# Patient Record
Sex: MALE | Race: WHITE | Employment: FULL TIME | URBAN - METROPOLITAN AREA
[De-identification: names, ages, dates, MRNs, and addresses within clinical notes are randomized per-mention and may not be internally consistent; named-entity substitution may affect disease eponyms.]

---

## 2018-05-20 ENCOUNTER — HOSPITAL ENCOUNTER (EMERGENCY)
Age: 28
Discharge: HOME | End: 2018-05-20
Attending: EMERGENCY MEDICINE
Payer: COMMERCIAL

## 2018-05-20 VITALS
SYSTOLIC BLOOD PRESSURE: 141 MMHG | DIASTOLIC BLOOD PRESSURE: 83 MMHG | OXYGEN SATURATION: 99 % | HEART RATE: 74 BPM | BODY MASS INDEX: 28.15 KG/M2 | TEMPERATURE: 98.2 F | RESPIRATION RATE: 16 BRPM | HEIGHT: 71 IN | WEIGHT: 201.06 LBS

## 2018-05-20 DIAGNOSIS — T14.8XXA ABRASION: Primary | ICD-10-CM

## 2018-05-20 DIAGNOSIS — R19.8 UMBILICUS DISCHARGE: ICD-10-CM

## 2018-05-20 PROCEDURE — 99283 EMERGENCY DEPT VISIT LOW MDM: CPT

## 2018-05-20 PROCEDURE — 74011000250 HC RX REV CODE- 250: Performed by: PHYSICIAN ASSISTANT

## 2018-05-20 PROCEDURE — 74011250637 HC RX REV CODE- 250/637: Performed by: PHYSICIAN ASSISTANT

## 2018-05-20 RX ORDER — MUPIROCIN 20 MG/G
OINTMENT TOPICAL EVERY 12 HOURS
Status: DISCONTINUED | OUTPATIENT
Start: 2018-05-20 | End: 2018-05-20 | Stop reason: HOSPADM

## 2018-05-20 RX ORDER — MUPIROCIN 20 MG/G
OINTMENT TOPICAL 3 TIMES DAILY
Qty: 22 G | Refills: 0 | Status: SHIPPED | OUTPATIENT
Start: 2018-05-20

## 2018-05-20 RX ADMIN — MUPIROCIN: 20 OINTMENT TOPICAL at 18:19

## 2018-05-20 RX ADMIN — Medication 2 ML: at 17:30

## 2018-05-20 RX ADMIN — BACITRACIN ZINC, NEOMYCIN SULFATE, POLYMYXIN B SULFATE 1 PACKET: 3.5; 5000; 4 OINTMENT TOPICAL at 17:30

## 2018-05-20 NOTE — DISCHARGE INSTRUCTIONS
[facility] SYSTEMS Departments     For adult and child immunizations, family planning, TB screening, STD testing and women's health services. [place]: [place] [phone]      [employer] Maria Alejandra [address] 25   657 [facility]   [address]   170 [facility] [address]  [phone]      [address]          [address] [place] [address]     For primary care services, woman and child wellness, and some clinics providing specialty care. [facility] [address] [facility] [address] [place] [address] [phone]   [facility] 200 [facility] [phone]   339 [facility] [address]  [phone] 11878 [facility] [address] [place] [address] [address] [place] [address]  [phone]   7701 [facility] [phone]   [facility] 81 [facility] [phone]   [facility] [address] [place], [place] [phone]   [facility]: [facility] [address], ext [facility] 79 [facility] [address] [phone]     Afshin [address] [facility] [address] [phone]   [facility] 5850 [facility]  [phone]   [employer]  [address], [address] ([email or web address]) [phone]         Sexual Health/Woman Wellness Clinics    For STD/HIV testing and treatment, pregnancy testing and services, men's health, birth control services, LGBT services, and hepatitis/HPV vaccine services. [employer] for [place] [employer] [address] 75 [facility] 1579 [address] [phone]   [facility] [address], 5th floor [phone]   Pregnancy [address] Women [address]  Vivienne Jha [phone]         Specialty Service 9290 [facility]   [phone]   Josephine   [phone]   Women, Infant and Children's Services: [address] [phone] 600 Critical access hospital   [phone]   [address]   [facility]     [phone]   [address] Crisis   [address] [phone]     Local Primary Care Physicians  [facility] [phone]  MD Clarisa Higgins MD Lonie Landsberg, MD [facility] Community Doctors [phone]  Debi Collins, [facility]  MD Mechelle Parker MD Darcey Hahn, MD [address] [place] [address] [phone]  MD Flaquito Shaffer MD [address] [facility] [phone]  MD Lindsey Disla, MD Madina Rodriguez MD   [facility] [phone]  [facility] INDERJIT , MD Alo Negrete, NP [address] [phone]  Afshin Magana, MD Yousuf Schmitt MD Patrisha Ganong, MD Felecia Amass, MD Molly Crank, MD Odella Ormond, MD   33 57 [facility]  Anselmo Phillips MD [address] [phone]  MD Luzmaria Delgado, NP  MD Keisha Jacobo MD Agustin Kaiser, MD Lyndal Cromer, MD David Gant, MD   8262 [facility] [phone]  MD Agnieszka Valencia, P  Vineet Romero, MD Max Paz MD Neal Coulter, MD Freda Mcmurray, MD [facility] [phone]  MD Shelley Aparicio MD Marcellina Hopper, MD Valentin Corrigan, MD Blase Cree, MD   [address] [phone]  MD Adrianne Duarte MD [place] [phone]  Lesta Reeks, MD Roxana Rubinstein, MD Armon Brenner [place], [address] [facility] [phone]  Eugenia Hall, MD Marie Gómez, MD Rosa Casas, MD Suhas Brunner, MD Mar Washington, MD Jessica Eller, NP  Gopal Vela MD [address]  66   [phone]  Juliana Avers, MD Jacqualine Schaumann, MD Ty Maurice MD   2102 [facility] [phone]  Ronal Najera, MD Vanessa Antunez, P  Wei Alanis, PA-C  Raghu Delgadillo, FNP  Tavo Cancino, PA-C  MD Yady Putnam, NP   Tony Gilford, DO Miscellaneous:  Vikas Bourgeois [phone]

## 2018-05-20 NOTE — ED PROVIDER NOTES
EMERGENCY DEPARTMENT HISTORY AND PHYSICAL EXAM      Date: 5/20/2018  Patient Name: Kwabena Linn    History of Presenting Illness     Chief Complaint   Patient presents with    Laceration     Patient reports feeling a \"tear\" sensation to his umbilical area while lifting a keg at work on Friday. Pt reports area began bleeding today and was informed by [facility] that there was a laceration inside his belly button. History Provided By: Patient    HPI: Kwabena Linn, 33 y.o. male with no significant PMHx, presents ambulatory to the ED with cc of bleeding from his umbilicus which started today. Pt states he was seen at [facility] where he was diagnosed with a laceration without foreign body of the abdominal wall without penetration into the abdominal cavity. He was referred to the ED for stitches. Pt states he was pulling a 160 lb keg down a flight of stairs 2 days ago, and he thinks he could have injured his belly button. He reports no direct injury to the area with the event. Pt reports no bleeding after carrying the keg. He states his pain is mild. His pain is worse with touching the affected area. Pt describes that today \"it was like a zit popped and there was a little clear fluid\". He reports no recent fevers. Pt reports no injury today. He is otherwise without complaint. Chief Complaint: bleeding from umbilicus  Duration: 1 day  Timing:  Acute  Location: umbilicus  Quality: Aching  Severity: Mild  Modifying Factors: none  Associated Symptoms: denies any other associated signs or symptoms    There are no other complaints, changes, or physical findings at this time. PCP: None    Past History     Past Medical History:  History reviewed. No pertinent past medical history. Past Surgical History:  History reviewed. No pertinent surgical history. Family History:  History reviewed. No pertinent family history.     Social History:  Social History   Substance Use Topics    Smoking status: Never Smoker    Smokeless tobacco: Never Used    Alcohol use None       Allergies:  No Known Allergies      Review of Systems   Review of Systems   Constitutional: Negative. Negative for fever. HENT: Negative. Eyes: Negative. Respiratory: Negative. Cardiovascular: Negative. Gastrointestinal: Negative. Negative for constipation, diarrhea, nausea and vomiting. Denies liver disease   Endocrine:        Denies thyroid disease   Genitourinary: Negative. Negative for dysuria. Denies kidney disease   Musculoskeletal: Negative. Skin: Positive for wound (bleeding from umbilicus). Neurological: Negative. All other systems reviewed and are negative. Physical Exam   Physical Exam   Constitutional: He is oriented to person, place, and time. He appears well-developed and well-nourished. No distress. HENT:   Head: Normocephalic and atraumatic. Eyes: Conjunctivae and EOM are normal. Pupils are equal, round, and reactive to light. Right eye exhibits no discharge. Left eye exhibits no discharge. No scleral icterus. Neck: Normal range of motion. Neck supple. No tracheal deviation present. Cardiovascular: Normal rate, regular rhythm, normal heart sounds and intact distal pulses. Exam reveals no gallop and no friction rub. No murmur heard. Pulmonary/Chest: Effort normal and breath sounds normal. No respiratory distress. He has no wheezes. He has no rales. He exhibits no tenderness. Abdominal: Soft. Bowel sounds are normal. He exhibits no distension and no mass. There is no tenderness. There is no rebound and no guarding. Pt is thin. Dried blood and tenderness to his umbilicus. No active bleeding. Musculoskeletal: He exhibits no edema or tenderness. Lymphadenopathy:     He has no cervical adenopathy. Neurological: He is alert and oriented to person, place, and time. No cranial nerve deficit. Coordination normal.   Skin: Skin is warm and dry. No rash noted. No erythema. Psychiatric: He has a normal mood and affect. His behavior is normal. Judgment and thought content normal.   Nursing note and vitals reviewed. Medical Decision Making   I am the first provider for this patient. I reviewed the vital signs, available nursing notes, past medical history, past surgical history, family history and social history. Vital Signs-Reviewed the patient's vital signs. Patient Vitals for the past 12 hrs:   Temp Pulse Resp BP SpO2   05/20/18 1459 98.2 °F (36.8 °C) 74 16 141/83 99 %     Records Reviewed: Nursing Notes and Old Medical Records    Provider Notes (Medical Decision Making):   DDx: laceration, abscess     ED Course:   Initial assessment performed. The patients presenting problems have been discussed, and they are in agreement with the care plan formulated and outlined with them. I have encouraged them to ask questions as they arise throughout their visit.    6:01 PM  Area was cleansed with SurClens. Irrigated with saline. LET was applied. The wound is an abrasion/ erosion of the skin on the right side deep in the folds of his umbilicus. There is no laceration in need of repair. No visible abdominal content. No palpable hernia with valsalva. Dr. Anita Curry has seen the area as well and agrees with the care plan. Disposition:  DISCHARGE NOTE  6:03 PM  The patient has been re-evaluated and is ready for discharge. Reviewed available results with patient. Counseled patient on diagnosis and care plan. Patient has expressed understanding, and all questions have been answered. Patient agrees with plan and agrees to follow up as recommended, or return to the ED if their symptoms worsen. Discharge instructions have been provided and explained to the patient, along with reasons to return to the ED. PLAN:  1. Current Discharge Medication List      START taking these medications    Details   mupirocin (BACTROBAN) 2 % ointment Apply  to affected area three (3) times daily.  Apply to area for 10 days  Qty: 22 g, Refills: 0           2. Follow-up Information     Follow up With Details Comments Contact Info    [facility] EMERGENCY DEPT  If symptoms worsen [address] [facility] [id number] [phone]        Return to ED if worse     Diagnosis     Clinical Impression:   1. Abrasion    2. Umbilicus discharge        Attestations:    Attestation Note:  This note is prepared by [facility], acting as Scribe for [address] [place] [address] PAINA: The scribe's documentation has been prepared under my direction and personally reviewed by me in its entirety. I confirm that the note above accurately reflects all work, treatment, procedures, and medical decision making performed by me.

## 2018-05-20 NOTE — ED NOTES
Patient discharged by MIKE Colvin. Patient provided with discharge instructions Rx and instructions on follow up care. Patient out of ED ambulatory accompanied by self.

## 2021-06-14 NOTE — LETTER
[address] 
[facility] EMERGENCY DEPT 
[address] [place] [address] [place] [id number] [phone] Work/School Note Date: 5/20/2018 To Whom It May concern: 
 
Nathalie Ngo was seen and treated today in the emergency room by the following provider(s): 
Attending Provider: Vannessa Joseph MD 
Physician Assistant: MIKE Guerrier. Nathalie Ngo may return to work on 5/23/18 or sooner, if feeling better. He should lift no greater than 15lbs until 5/27/18. Sincerely, Stefano Marcos., PA 
 
 
 
 DIFFICULTY BREATHING/DYSPNEA ON EXERTION/SHORTNESS OF BREATH

## 2023-09-17 SDOH — HEALTH STABILITY: PHYSICAL HEALTH: ON AVERAGE, HOW MANY MINUTES DO YOU ENGAGE IN EXERCISE AT THIS LEVEL?: 40 MIN

## 2023-09-17 SDOH — HEALTH STABILITY: PHYSICAL HEALTH: ON AVERAGE, HOW MANY DAYS PER WEEK DO YOU ENGAGE IN MODERATE TO STRENUOUS EXERCISE (LIKE A BRISK WALK)?: 4 DAYS

## 2023-09-19 ENCOUNTER — OFFICE VISIT (OUTPATIENT)
Facility: CLINIC | Age: 33
End: 2023-09-19

## 2023-09-19 VITALS
DIASTOLIC BLOOD PRESSURE: 65 MMHG | BODY MASS INDEX: 27.02 KG/M2 | RESPIRATION RATE: 16 BRPM | SYSTOLIC BLOOD PRESSURE: 118 MMHG | WEIGHT: 193 LBS | OXYGEN SATURATION: 98 % | TEMPERATURE: 98.6 F | HEIGHT: 71 IN | HEART RATE: 65 BPM

## 2023-09-19 DIAGNOSIS — K21.9 GASTROESOPHAGEAL REFLUX DISEASE, UNSPECIFIED WHETHER ESOPHAGITIS PRESENT: ICD-10-CM

## 2023-09-19 DIAGNOSIS — R10.11 RUQ ABDOMINAL PAIN: Primary | ICD-10-CM

## 2023-09-19 DIAGNOSIS — Z13.6 SCREENING FOR CARDIOVASCULAR CONDITION: ICD-10-CM

## 2023-09-19 RX ORDER — FAMOTIDINE 40 MG/1
40 TABLET, FILM COATED ORAL EVERY EVENING
Qty: 30 TABLET | Refills: 3 | Status: SHIPPED | OUTPATIENT
Start: 2023-09-19

## 2023-09-19 SDOH — ECONOMIC STABILITY: INCOME INSECURITY: HOW HARD IS IT FOR YOU TO PAY FOR THE VERY BASICS LIKE FOOD, HOUSING, MEDICAL CARE, AND HEATING?: NOT HARD AT ALL

## 2023-09-19 SDOH — ECONOMIC STABILITY: FOOD INSECURITY: WITHIN THE PAST 12 MONTHS, YOU WORRIED THAT YOUR FOOD WOULD RUN OUT BEFORE YOU GOT MONEY TO BUY MORE.: NEVER TRUE

## 2023-09-19 SDOH — ECONOMIC STABILITY: HOUSING INSECURITY
IN THE LAST 12 MONTHS, WAS THERE A TIME WHEN YOU DID NOT HAVE A STEADY PLACE TO SLEEP OR SLEPT IN A SHELTER (INCLUDING NOW)?: NO

## 2023-09-19 SDOH — ECONOMIC STABILITY: FOOD INSECURITY: WITHIN THE PAST 12 MONTHS, THE FOOD YOU BOUGHT JUST DIDN'T LAST AND YOU DIDN'T HAVE MONEY TO GET MORE.: NEVER TRUE

## 2023-09-19 ASSESSMENT — ANXIETY QUESTIONNAIRES
2. NOT BEING ABLE TO STOP OR CONTROL WORRYING: 0
IF YOU CHECKED OFF ANY PROBLEMS ON THIS QUESTIONNAIRE, HOW DIFFICULT HAVE THESE PROBLEMS MADE IT FOR YOU TO DO YOUR WORK, TAKE CARE OF THINGS AT HOME, OR GET ALONG WITH OTHER PEOPLE: NOT DIFFICULT AT ALL
1. FEELING NERVOUS, ANXIOUS, OR ON EDGE: 0

## 2023-09-19 ASSESSMENT — PATIENT HEALTH QUESTIONNAIRE - PHQ9
SUM OF ALL RESPONSES TO PHQ QUESTIONS 1-9: 0
1. LITTLE INTEREST OR PLEASURE IN DOING THINGS: 0
2. FEELING DOWN, DEPRESSED OR HOPELESS: 0
SUM OF ALL RESPONSES TO PHQ QUESTIONS 1-9: 0
SUM OF ALL RESPONSES TO PHQ9 QUESTIONS 1 & 2: 0

## 2023-09-19 NOTE — PROGRESS NOTES
Chief Complaint   Patient presents with    Establish Care    Abdominal Pain     Abd pain after eating sometimes upper R/quad pressure  Loose BM x 2 mth,  fever x 1 week on and on prior. Patient has not been out of the country in (14 months), NO diarrhea, NO cough, NO chest conjestion, NO temp. Pt has not been around anyone with these symptoms. Health Maintenance reviewed. I have reviewed the patient's medical history in detail and updated the computerized patient record. no    1. \"Have you been to the ER, urgent care clinic since your last visit? No Hospitalized since your last visit?\"     2. \"Have you seen or consulted any other health care providers outside of the [address] [place] [address] since your last visit? \" no     3. For patients aged 43-73: Has the patient had a colonoscopy / FIT/ Cologuard?  no

## 2023-09-25 ENCOUNTER — TELEPHONE (OUTPATIENT)
Facility: CLINIC | Age: 33
End: 2023-09-25

## 2023-10-16 NOTE — PROGRESS NOTES
Ye Almonte (:  1990) is a 29 y.o. male,Established patient, here for evaluation of the following chief complaint(s):  No chief complaint on file. Abdominal pain       ASSESSMENT/PLAN:     Diagnosis Orders   1. Right upper quadrant abdominal pain            Get US and labs done  F/up after      Subjective   SUBJECTIVE/OBJECTIVE:  HPI  Pain has improved. Started in July. Every other day or so tends to be right sided mid abdo. Lasts maybe half an hr then resolves on its own. Did 'nt do US or labs ordered at the last visit, [place] did not approve. Review of Systems   No constipation, occa loose bowels but no diarrhea. No Fever. No HB No meds    Past Surgical History:   Procedure Laterality Date    WISDOM TOOTH EXTRACTION       Social History     Socioeconomic History    Marital status: Single     Spouse name: Not on file    Number of children: Not on file    Years of education: Not on file    Highest education level: Not on file   Occupational History    Occupation:    Tobacco Use    Smoking status: Some Days     Packs/day: 0.05     Years: 9.00     Additional pack years: 0.00     Total pack years: 0.45     Types: Cigarettes     Passive exposure: Never    Smokeless tobacco: Former   Substance and Sexual Activity    Alcohol use: Not Currently     Comment: occ    Drug use: Never    Sexual activity: Not on file   Other Topics Concern    Not on file   Social History Narrative    Lives alone     Social Determinants of Health     Financial Resource Strain: Low Risk  (2023)    Overall Financial Resource Strain (CARDIA)     Difficulty of Paying Living Expenses: Not hard at all   Food Insecurity: No Food Insecurity (2023)    Hunger Vital Sign     Worried About Running Out of Food in the Last Year: Never true     [address] in the Last Year: Never true   Transportation Needs: Unknown (2023)    PRAPARE - Transportation     Lack of Transportation (Medical):  Not on file     Lack of

## 2023-10-17 ENCOUNTER — OFFICE VISIT (OUTPATIENT)
Facility: CLINIC | Age: 33
End: 2023-10-17
Payer: OTHER GOVERNMENT

## 2023-10-17 VITALS
SYSTOLIC BLOOD PRESSURE: 125 MMHG | OXYGEN SATURATION: 98 % | BODY MASS INDEX: 27.77 KG/M2 | RESPIRATION RATE: 16 BRPM | WEIGHT: 194 LBS | TEMPERATURE: 98.6 F | HEIGHT: 70 IN | DIASTOLIC BLOOD PRESSURE: 81 MMHG | HEART RATE: 72 BPM

## 2023-10-17 DIAGNOSIS — R10.11 RIGHT UPPER QUADRANT ABDOMINAL PAIN: Primary | ICD-10-CM

## 2023-10-17 PROCEDURE — 99213 OFFICE O/P EST LOW 20 MIN: CPT | Performed by: FAMILY MEDICINE

## 2023-10-17 ASSESSMENT — PATIENT HEALTH QUESTIONNAIRE - PHQ9
2. FEELING DOWN, DEPRESSED OR HOPELESS: 1
SUM OF ALL RESPONSES TO PHQ QUESTIONS 1-9: 2
SUM OF ALL RESPONSES TO PHQ QUESTIONS 1-9: 2
1. LITTLE INTEREST OR PLEASURE IN DOING THINGS: 1
SUM OF ALL RESPONSES TO PHQ QUESTIONS 1-9: 2
SUM OF ALL RESPONSES TO PHQ QUESTIONS 1-9: 2
SUM OF ALL RESPONSES TO PHQ9 QUESTIONS 1 & 2: 2

## 2023-11-13 ENCOUNTER — TELEPHONE (OUTPATIENT)
Facility: CLINIC | Age: 33
End: 2023-11-13

## 2024-01-18 ENCOUNTER — TELEPHONE (OUTPATIENT)
Facility: CLINIC | Age: 34
End: 2024-01-18

## 2024-01-18 NOTE — TELEPHONE ENCOUNTER
----- Message from Cyn Elmo sent at 1/18/2024  3:08 PM EST -----  Subject: Referral Request    Reason for referral request? Pt would like to add a Full STD panel to his   blood work [facility]  Provider patient wants to be referred to(if known):     Provider Phone Number(if known):    Additional Information for Provider? Please advise if he needs to have an   office visit for this  ---------------------------------------------------------------------------  --------------  CALL BACK INFO    [phone]; OK to leave message on voicemail  ---------------------------------------------------------------------------  --------------

## 2024-01-19 ENCOUNTER — TELEPHONE (OUTPATIENT)
Facility: CLINIC | Age: 34
End: 2024-01-19

## 2024-01-19 NOTE — TELEPHONE ENCOUNTER
[facility] with confirmation  [phone]. Called patient

## 2024-01-19 NOTE — TELEPHONE ENCOUNTER
----- Message from Cyn Borrego sent at 1/18/2024  3:06 PM EST -----  Subject: Message to Provider    QUESTIONS  Information for Provider? Pt would like follow up from a request to have   labs to be sent to [facility]. He [facility]. Please call back   ---------------------------------------------------------------------------  --------------  CALL BACK INFO  [phone]; OK to leave message on voicemail  ---------------------------------------------------------------------------  --------------  SCRIPT ANSWERS  Relationship to Patient? Self

## 2024-01-19 NOTE — TELEPHONE ENCOUNTER
----- Message from Cyn Borrego sent at 1/18/2024  3:06 PM EST -----  Subject: Message to Provider    QUESTIONS  Information for Provider? Pt would like follow up from a request to have   labs to be sent to [facility]. He said [facility] of this [facility]. Please call back   ---------------------------------------------------------------------------  --------------  CALL BACK INFO  [phone]; OK to leave message on voicemail  ---------------------------------------------------------------------------  --------------  SCRIPT ANSWERS  Relationship to Patient? Self

## 2024-02-09 ENCOUNTER — TELEMEDICINE (OUTPATIENT)
Facility: CLINIC | Age: 34
End: 2024-02-09
Payer: OTHER GOVERNMENT

## 2024-02-09 DIAGNOSIS — Z11.3 ROUTINE SCREENING FOR STI (SEXUALLY TRANSMITTED INFECTION): Primary | ICD-10-CM

## 2024-02-09 PROCEDURE — 99213 OFFICE O/P EST LOW 20 MIN: CPT | Performed by: FAMILY MEDICINE

## 2024-02-09 NOTE — PROGRESS NOTES
Harish Kelly, was evaluated through a synchronous (real-time) audio-video encounter. The patient (or guardian if applicable) is aware that this is a billable service, which includes applicable co-pays. This Virtual Visit was conducted with patient's (and/or legal guardian's) consent. Patient identification was verified, and a caregiver was present when appropriate.   The patient was located at Home: [address] [place] [address]  Provider was located at Facility (Appt Dept): [address] [place] [address] [address] [place] [phone]      Harish Kelly (:  1990) is a Established patient, presenting virtually for evaluation of the following:    Assessment & Plan   Below is the assessment and plan developed based on review of pertinent history, physical exam, labs, studies, and medications.  1. Routine screening for STI (sexually transmitted infection)  -     Chlamydia, Gonorrhea, Trichomoniasis; Future  -     Hepatitis B Surface Antigen; Future  -     HIV 1/2 Ag/Ab, 4TH Generation,W Rflx Confirm; Future  -     Hepatitis C Ab, Rflx to Qt by PCR; Future  -     RPR; Future    As above labs to go once previously ordered encouraged him to do those     Encouraged him to [facility] and then [facility] his [facility].    Subjective   HPI    He wants to be tested for STIs.  No complaints other than the usual occasional abdominal pains which are not too bad.    Review of Systems   No discharge groin pain dysuria    Objective   Patient-Reported Vitals  No data recorded     Physical Exam    Appears to be in no acute distress, grossly 2 through 12 are nonfocal.             --Kai Goff MD

## 2024-02-09 NOTE — PROGRESS NOTES
Chief Complaint   Patient presents with    Sexually Transmitted Diseases     Discussion of STD testing